# Patient Record
(demographics unavailable — no encounter records)

---

## 2024-12-13 NOTE — HISTORY OF PRESENT ILLNESS
[FreeTextEntry1] : 38 year old female presents for evaluation of possible sleep apnea  Patient was at the dentist where she was referred to pulmonology for a sleep study secondary to teeth grinding for possible sleep apnea  Patient notes that she finds herself moderately tired throughout the day, notes she has jaw pain and headaches when she wakes up in the morning, patient has used a mouth guard for teeth grinding in the past Patient is currently in her first pregnancy , No hypertension issues

## 2024-12-13 NOTE — ASSESSMENT
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  Recommend home sleep study for further evaluation  Based on history and physical the patient has a high likelihood of having obstructive sleep apnea. Further assessment by sleep testing is recommended. There is no contraindication to a home sleep study. We will therefore proceed to two night home apnea sleep study for further assessment.

## 2024-12-13 NOTE — PHYSICAL EXAM
[General Appearance - Well Developed] : well developed [Normal Appearance] : normal appearance [Well Groomed] : well groomed [General Appearance - Well Nourished] : well nourished [No Deformities] : no deformities [General Appearance - In No Acute Distress] : no acute distress [Normal Conjunctiva] : the conjunctiva exhibited no abnormalities [Eyelids - No Xanthelasma] : the eyelids demonstrated no xanthelasmas [Low Lying Soft Palate] : low lying soft palate [Neck Appearance] : the appearance of the neck was normal [Neck Cervical Mass (___cm)] : no neck mass was observed [Jugular Venous Distention Increased] : there was no jugular-venous distention [Thyroid Diffuse Enlargement] : the thyroid was not enlarged [Thyroid Nodule] : there were no palpable thyroid nodules [Heart Rate And Rhythm] : heart rate was normal and rhythm regular [Heart Sounds] : normal S1 and S2 [Heart Sounds Gallop] : no gallops [Murmurs] : no murmurs [Heart Sounds Pericardial Friction Rub] : no pericardial rub [Auscultation Breath Sounds / Voice Sounds] : lungs were clear to auscultation bilaterally [Abnormal Walk] : normal gait [Musculoskeletal - Swelling] : no joint swelling seen [Motor Tone] : muscle strength and tone were normal [Nail Clubbing] : no clubbing of the fingernails [Cyanosis, Localized] : no localized cyanosis [Petechial Hemorrhages (___cm)] : no petechial hemorrhages [] : no ischemic changes

## 2024-12-13 NOTE — ADDENDUM
[FreeTextEntry1] : Recorded by Woodrow Winston acting as a scribe for Dr. Saroj Godoy M.D, on 12/13/2024  All medical record entries made by the Scribe were at my, Dr. Saroj Godoy M.D., direction and personally dictated by me on 12/13/2024. I have reviewed the chart and agree that the record accurately reflects my personal performance of the history, physical exam, assessment and plan. I have also personally directed, reviewed, and agreed with the chart.

## 2025-01-09 NOTE — HISTORY OF PRESENT ILLNESS
[TextBox_4] : Telehealth visit to follow-up on results of sleep study.  Currently 8 months pregnant.  Underwent sleep study because of snoring and grinding complaints referred by dentist

## 2025-01-09 NOTE — ASSESSMENT
[FreeTextEntry1] : Mild sleep apnea on testing Can consider oral appliance therapy if desired Will refer back to sleep dentist

## 2025-01-09 NOTE — REASON FOR VISIT
[Home] : at home, [unfilled] , at the time of the visit. [Medical Office: ([facility])___] : at the medical office located in